# Patient Record
Sex: MALE | Race: WHITE
[De-identification: names, ages, dates, MRNs, and addresses within clinical notes are randomized per-mention and may not be internally consistent; named-entity substitution may affect disease eponyms.]

---

## 2018-04-26 ENCOUNTER — HOSPITAL ENCOUNTER (OUTPATIENT)
Dept: HOSPITAL 17 - NEPE | Age: 53
Setting detail: OBSERVATION
LOS: 2 days | Discharge: HOME | End: 2018-04-28
Attending: HOSPITALIST | Admitting: HOSPITALIST
Payer: SELF-PAY

## 2018-04-26 VITALS
DIASTOLIC BLOOD PRESSURE: 80 MMHG | RESPIRATION RATE: 18 BRPM | SYSTOLIC BLOOD PRESSURE: 155 MMHG | HEART RATE: 96 BPM | OXYGEN SATURATION: 99 %

## 2018-04-26 VITALS
SYSTOLIC BLOOD PRESSURE: 152 MMHG | HEART RATE: 104 BPM | RESPIRATION RATE: 16 BRPM | DIASTOLIC BLOOD PRESSURE: 76 MMHG | OXYGEN SATURATION: 100 %

## 2018-04-26 VITALS
OXYGEN SATURATION: 99 % | RESPIRATION RATE: 19 BRPM | DIASTOLIC BLOOD PRESSURE: 91 MMHG | HEART RATE: 98 BPM | SYSTOLIC BLOOD PRESSURE: 179 MMHG | TEMPERATURE: 98.8 F

## 2018-04-26 VITALS
TEMPERATURE: 98.6 F | DIASTOLIC BLOOD PRESSURE: 77 MMHG | HEART RATE: 95 BPM | SYSTOLIC BLOOD PRESSURE: 122 MMHG | OXYGEN SATURATION: 98 % | RESPIRATION RATE: 18 BRPM

## 2018-04-26 VITALS — WEIGHT: 198.42 LBS | HEIGHT: 73 IN | BODY MASS INDEX: 26.3 KG/M2

## 2018-04-26 VITALS
OXYGEN SATURATION: 98 % | DIASTOLIC BLOOD PRESSURE: 82 MMHG | RESPIRATION RATE: 20 BRPM | HEART RATE: 98 BPM | SYSTOLIC BLOOD PRESSURE: 134 MMHG

## 2018-04-26 DIAGNOSIS — G89.29: ICD-10-CM

## 2018-04-26 DIAGNOSIS — R42: ICD-10-CM

## 2018-04-26 DIAGNOSIS — R00.0: ICD-10-CM

## 2018-04-26 DIAGNOSIS — I10: ICD-10-CM

## 2018-04-26 DIAGNOSIS — R61: ICD-10-CM

## 2018-04-26 DIAGNOSIS — M54.9: ICD-10-CM

## 2018-04-26 DIAGNOSIS — E87.1: ICD-10-CM

## 2018-04-26 DIAGNOSIS — D72.829: ICD-10-CM

## 2018-04-26 DIAGNOSIS — R55: Primary | ICD-10-CM

## 2018-04-26 DIAGNOSIS — Z79.899: ICD-10-CM

## 2018-04-26 LAB
BASOPHILS # BLD AUTO: 0 TH/MM3 (ref 0–0.2)
BASOPHILS NFR BLD: 0.2 % (ref 0–2)
BUN SERPL-MCNC: 15 MG/DL (ref 7–18)
CALCIUM SERPL-MCNC: 9.6 MG/DL (ref 8.5–10.1)
CHLORIDE SERPL-SCNC: 99 MEQ/L (ref 98–107)
CREAT SERPL-MCNC: 1.09 MG/DL (ref 0.6–1.3)
EOSINOPHIL # BLD: 0 TH/MM3 (ref 0–0.4)
EOSINOPHIL NFR BLD: 0.1 % (ref 0–4)
ERYTHROCYTE [DISTWIDTH] IN BLOOD BY AUTOMATED COUNT: 12.6 % (ref 11.6–17.2)
GFR SERPLBLD BASED ON 1.73 SQ M-ARVRAT: 71 ML/MIN (ref 89–?)
GLUCOSE SERPL-MCNC: 133 MG/DL (ref 74–106)
HCO3 BLD-SCNC: 23 MEQ/L (ref 21–32)
HCT VFR BLD CALC: 42.2 % (ref 39–51)
HGB BLD-MCNC: 14.7 GM/DL (ref 13–17)
LYMPHOCYTES # BLD AUTO: 1.5 TH/MM3 (ref 1–4.8)
LYMPHOCYTES NFR BLD AUTO: 10.6 % (ref 9–44)
MCH RBC QN AUTO: 31.7 PG (ref 27–34)
MCHC RBC AUTO-ENTMCNC: 34.9 % (ref 32–36)
MCV RBC AUTO: 91 FL (ref 80–100)
MONOCYTE #: 0.9 TH/MM3 (ref 0–0.9)
MONOCYTES NFR BLD: 6 % (ref 0–8)
NEUTROPHILS # BLD AUTO: 11.9 TH/MM3 (ref 1.8–7.7)
NEUTROPHILS NFR BLD AUTO: 83.1 % (ref 16–70)
PLATELET # BLD: 290 TH/MM3 (ref 150–450)
PMV BLD AUTO: 8.7 FL (ref 7–11)
RBC # BLD AUTO: 4.63 MIL/MM3 (ref 4.5–5.9)
SODIUM SERPL-SCNC: 135 MEQ/L (ref 136–145)
TROPONIN I SERPL-MCNC: (no result) NG/ML (ref 0.02–0.05)
WBC # BLD AUTO: 14.3 TH/MM3 (ref 4–11)

## 2018-04-26 PROCEDURE — 84484 ASSAY OF TROPONIN QUANT: CPT

## 2018-04-26 PROCEDURE — 82550 ASSAY OF CK (CPK): CPT

## 2018-04-26 PROCEDURE — 80307 DRUG TEST PRSMV CHEM ANLYZR: CPT

## 2018-04-26 PROCEDURE — 93880 EXTRACRANIAL BILAT STUDY: CPT

## 2018-04-26 PROCEDURE — 85025 COMPLETE CBC W/AUTO DIFF WBC: CPT

## 2018-04-26 PROCEDURE — 93225 XTRNL ECG REC<48 HRS REC: CPT

## 2018-04-26 PROCEDURE — 96360 HYDRATION IV INFUSION INIT: CPT

## 2018-04-26 PROCEDURE — 93005 ELECTROCARDIOGRAM TRACING: CPT

## 2018-04-26 PROCEDURE — 93226 XTRNL ECG REC<48 HR SCAN A/R: CPT

## 2018-04-26 PROCEDURE — G0378 HOSPITAL OBSERVATION PER HR: HCPCS

## 2018-04-26 PROCEDURE — 93306 TTE W/DOPPLER COMPLETE: CPT

## 2018-04-26 PROCEDURE — 99285 EMERGENCY DEPT VISIT HI MDM: CPT

## 2018-04-26 PROCEDURE — 97162 PT EVAL MOD COMPLEX 30 MIN: CPT

## 2018-04-26 PROCEDURE — 80048 BASIC METABOLIC PNL TOTAL CA: CPT

## 2018-04-26 RX ADMIN — Medication SCH ML: at 22:23

## 2018-04-26 RX ADMIN — HYDROCODONE BITARTRATE AND ACETAMINOPHEN PRN TAB: 7.5; 325 TABLET ORAL at 22:22

## 2018-04-26 NOTE — PD
HPI


Chief Complaint:  Dizziness


Time Seen by Provider:  17:19


Travel History


International Travel<30 days:  No


Contact w/Intl Traveler<30days:  No


Traveled to known affect area:  No





History of Present Illness


HPI


This is a 52-year-old male with a history of chronic back pain, presents today 

after having a syncopal episode while at a restaurant.  Patient apparently was 

eating with his friend when he suddenly became lightheaded and pale.  Friend 

states that he passed out for roughly 2 minutes total.  When he awoke, there 

was no postictal state.  He did not recall actually passing out but did really 

remember feeling funny.  Friend thought that he was either vasovagal or was 

mildly dehydrated.  He states that they brought him home and he had a second 

episode where he nearly passed out.  His friend who is a paramedic states he 

checked his pulse and blood pressure.  His systolic blood pressure was 80.  He 

states that he felt an irregular pulse and was concerned that this may be 

atrial fibrillation.  He reports that he started to regain his function and 

they drove here for further evaluation.  The patient denies any pain.  He 

denies any history of abnormal heart rhythm or previous syncopal episodes.  

There are no other complaints at time of examination.





PFSH


Past Medical History


Asthma:  No


Autoimmune Disease:  No


Cardiovascular Problems:  Yes


COPD:  No


Genitourinary:  No


Hypertension:  Yes


Musculoskeletal:  Yes


Neurologic:  Yes (herniated discs)


Respiratory:  Yes





Past Surgical History


Oral Surgery:  Yes


Other Surgery:  Yes





Social History


Alcohol Use:  Yes (WINE 4 GLASSES A WEEK 9/6/05)


Tobacco Use:  No


Substance Use:  No





Allergies-Medications


(Allergen,Severity, Reaction):  


Coded Allergies:  


     No Known Allergies (Verified  Allergy, Severe, 9/13/05)


Reported Meds & Prescriptions





Reported Meds & Active Scripts


Active


Reported


Tylenol-Codeine #3 (Acetaminophen-Codeine) 300-30 mg Tab 1 Tab PO Q4H PRN








Review of Systems


Except as stated in HPI:  all other systems reviewed are Neg


General / Constitutional:  No: Fever, Chills


HENT:  Positive: Lightheadedness, No: Headaches, Neck Pain


Cardiovascular:  Positive: Syncope, No: Chest Pain or Discomfort, Palpitations, 

Irregular Rhythm


Respiratory:  No: Cough, Shortness of Breath


Gastrointestinal:  No: Nausea, Vomiting, Abdominal Pain


Genitourinary:  No: Dysuria


Musculoskeletal:  No: Weakness, Pain


Neurologic:  Positive: Syncope, No: Dizziness, Headache, Change in Mentation





Physical Exam


Narrative


GENERAL: Well developed well-nourished male in no acute respiratory distress.


SKIN: Focused skin assessment warm/dry.


HEAD: Atraumatic. Normocephalic. 


EYES: Pupils equal and round. No scleral icterus. No injection or drainage. 


ENT: No nasal bleeding or discharge.  Mucous membranes pink and moist.


NECK: Trachea midline. No JVD. 


CARDIOVASCULAR: Sinus tachycardia rate in the low 100s.


RESPIRATORY: No accessory muscle use. Clear to auscultation. Breath sounds 

equal bilaterally. 


GASTROINTESTINAL: Abdomen soft, non-tender, nondistended. Hepatic and splenic 

margins not palpable. 


MUSCULOSKELETAL: No obvious deformities. No clubbing.  No cyanosis.  No edema. 


NEUROLOGICAL: Awake and alert. No obvious cranial nerve deficits.  Motor 

grossly within normal limits. Normal speech.





Data


Data


Last Documented VS





Vital Signs








  Date Time  Temp Pulse Resp B/P (MAP) Pulse Ox O2 Delivery O2 Flow Rate FiO2


 


4/26/18 19:19  106 16  98 Room Air  


 


4/26/18 16:45    155/80 (105)    


 


4/26/18 16:26 98.8       








Orders





 Orders


Complete Blood Count With Diff (4/26/18 17:22)


Basic Metabolic Panel (Bmp) (4/26/18 17:22)


Sodium Chlor 0.9% 1000 Ml Inj (Ns 1000 M (4/26/18 17:30)


Sodium Chlor 0.9% 1000 Ml Inj (Ns 1000 M (4/26/18 17:30)


Electrocardiogram (4/26/18 16:33)


Drug Screen, Random Urine (4/26/18 17:53)


Ckmb (Isoenzyme) Profile (4/26/18 17:25)


Troponin I (4/26/18 17:25)


Oxycodone-Acetamin  Mg (Percocet 1 (4/26/18 19:30)


Diclofenac  (Voltaren Dr) (4/26/18 19:30)


Admit Order (Ed Use Only) (4/26/18 19:29)





Labs





Laboratory Tests








Test


  4/26/18


17:25 4/26/18


17:55


 


White Blood Count 14.3 TH/MM3  


 


Red Blood Count 4.63 MIL/MM3  


 


Hemoglobin 14.7 GM/DL  


 


Hematocrit 42.2 %  


 


Mean Corpuscular Volume 91.0 FL  


 


Mean Corpuscular Hemoglobin 31.7 PG  


 


Mean Corpuscular Hemoglobin


Concent 34.9 % 


  


 


 


Red Cell Distribution Width 12.6 %  


 


Platelet Count 290 TH/MM3  


 


Mean Platelet Volume 8.7 FL  


 


Neutrophils (%) (Auto) 83.1 %  


 


Lymphocytes (%) (Auto) 10.6 %  


 


Monocytes (%) (Auto) 6.0 %  


 


Eosinophils (%) (Auto) 0.1 %  


 


Basophils (%) (Auto) 0.2 %  


 


Neutrophils # (Auto) 11.9 TH/MM3  


 


Lymphocytes # (Auto) 1.5 TH/MM3  


 


Monocytes # (Auto) 0.9 TH/MM3  


 


Eosinophils # (Auto) 0.0 TH/MM3  


 


Basophils # (Auto) 0.0 TH/MM3  


 


CBC Comment DIFF FINAL  


 


Differential Comment   


 


Blood Urea Nitrogen 15 MG/DL  


 


Creatinine 1.09 MG/DL  


 


Random Glucose 133 MG/DL  


 


Calcium Level 9.6 MG/DL  


 


Sodium Level 135 MEQ/L  


 


Potassium Level 3.9 MEQ/L  


 


Chloride Level 99 MEQ/L  


 


Carbon Dioxide Level 23.0 MEQ/L  


 


Anion Gap 13 MEQ/L  


 


Estimat Glomerular Filtration


Rate 71 ML/MIN 


  


 


 


Total Creatine Kinase 77 U/L  


 


Troponin I


  LESS THAN 0.02


NG/ML 


 


 


Urine Opiates Screen  POS 


 


Urine Barbiturates Screen  NEG 


 


Urine Amphetamines Screen  NEG 


 


Urine Benzodiazepines Screen  NEG 


 


Urine Cocaine Screen  NEG 


 


Urine Cannabinoids Screen  POS 











MDM


Medical Decision Making


Medical Screen Exam Complete:  Yes


Emergency Medical Condition:  Yes


Differential Diagnosis


Vasovagal syncope versus cardiac dysrhythmia versus metabolic derangement


Narrative Course


52-year-old male presents today after having syncopal episode and a following 

near syncopal episode.  Patient was tachycardic when he presented.  He was 

given 2 L of IV fluid and still remains tachycardic with heart rate of 100.  

Given this, patient will be admitted.  I discussed case with the on-call 

hospitalist who is agreeable for an observation admission.  The patient has no 

prior history of syncope.  He does have acute on chronic back pain.  He was 

given Voltaren and Percocet for his chronic back pain.





Diagnosis





 Primary Impression:  


 Syncope


 Additional Impressions:  


 HTN (hypertension)


 Chronic back pain





Admitting Information


Admitting Physician Requests:  Observation











Ed Roberts MD Apr 26, 2018 18:07

## 2018-04-26 NOTE — HHI.HP
__________________________________________________





hospitals


Service


[facility]


Primary Care Physician


Vlad Dunbar MD


Admission Diagnosis





syncope, persistant tachycardia, chronic back pain


Diagnoses:  


Travel History


International Travel<30 Days:  No


Contact w/Intl Traveler <30 Da:  No


Traveled to Known Affected Are:  No


History of Present Illness


52-year-old male with a past medical history significant for hypertension and 

degenerative disc disease presents to the emergency department for evaluation 

of a syncopal episode.  Patient reports that he was at 120 felt dizzy, faint 

and diaphoretic.  He slumped over and lost consciousness for approximately 2 

minutes which was witnessed by a friend.  No seizure-like activity.  No loss of 

bowel/bladder.  The patient decided to go home to rest and he was lying on the 

couch.  When he went to sit up from lying down he became diaphoretic and dizzy 

again.  This time he did not lose consciousness.  His friend took his blood 

pressure and found it to be 84/52.  The patient takes antihypertensive 

medication and an unspecified narcotic for his back pain.  He denies any chest 

pain or shortness of breath.  No nausea/vomiting/diarrhea/abdominal pain.  No 

lateralizing signs/symptoms.  No fever/chills.





Review of Systems


Except as stated in HPI:  all other systems reviewed are Neg





Past Family Social History


Past Medical History


Hypertension


Degenerative disc disease


Past Surgical History


Back surgery


Tonsillectomy


Reported Medications





Reported Meds & Active Scripts


Active


Reported


Tylenol-Codeine #3 (Acetaminophen-Codeine) 300-30 mg Tab 1 Tab PO Q4H PRN


Lisinopril-Hctz 10-12.5 Mg Tab Unknown Dose PO DAILY


Allergies:  


Coded Allergies:  


     No Known Allergies (Verified  Allergy, Severe, 05)


Family History


Negative for CAD/DM


Social History


Negative for tobacco.  Occasional alcohol.  Denies all illicit drugs





Physical Exam


Vital Signs





Vital Signs








  Date Time  Temp Pulse Resp B/P (MAP) Pulse Ox O2 Delivery O2 Flow Rate FiO2


 


18 20:40  104 16 152/76 (101) 100 Room Air  


 


18 19:19  106 16  98 Room Air  


 


4/26/18 16:45  96 18 155/80 (105) 99 Room Air  


 


18 16:26 98.8 98 19 179/91 (120) 99 Room Air  


 


18 16:15  98 20 134/82 (99) 98   








Physical Exam


GENERAL:  male sitting up in bed


SKIN: No rashes, ecchymoses or lesions. Cool and dry.


HEAD: Atraumatic. Normocephalic. No temporal or scalp tenderness.


EYES: Pupils equal round and reactive. Extraocular motions intact. No scleral 

icterus. No injection or drainage. 


ENT: Nose without bleeding, purulent drainage or septal hematoma. Throat 

without erythema, tonsillar hypertrophy or exudate. Uvula midline. Airway 

patent.


NECK: Trachea midline. No JVD or lymphadenopathy. Supple, nontender, no 

meningeal signs.


CARDIOVASCULAR: Regular rate and rhythm without murmurs, gallops, or rubs. 


RESPIRATORY: Clear to auscultation. Breath sounds equal bilaterally. No wheezes

, rales, or rhonchi.  


GASTROINTESTINAL: Abdomen soft, non-tender, nondistended. No hepato-splenomegaly

, or palpable masses. No guarding.


MUSCULOSKELETAL: Extremities without clubbing, cyanosis, or edema. No joint 

tenderness, effusion, or edema noted. No calf tenderness. 


NEUROLOGICAL: Awake and alert. Cranial nerves II through XII intact.  Motor and 

sensory grossly within normal limits. Normal speech.


Laboratory





Laboratory Tests








Test


  18


17:25 18


17:55


 


White Blood Count 14.3  


 


Red Blood Count 4.63  


 


Hemoglobin 14.7  


 


Hematocrit 42.2  


 


Mean Corpuscular Volume 91.0  


 


Mean Corpuscular Hemoglobin 31.7  


 


Mean Corpuscular Hemoglobin


Concent 34.9 


  


 


 


Red Cell Distribution Width 12.6  


 


Platelet Count 290  


 


Mean Platelet Volume 8.7  


 


Neutrophils (%) (Auto) 83.1  


 


Lymphocytes (%) (Auto) 10.6  


 


Monocytes (%) (Auto) 6.0  


 


Eosinophils (%) (Auto) 0.1  


 


Basophils (%) (Auto) 0.2  


 


Neutrophils # (Auto) 11.9  


 


Lymphocytes # (Auto) 1.5  


 


Monocytes # (Auto) 0.9  


 


Eosinophils # (Auto) 0.0  


 


Basophils # (Auto) 0.0  


 


CBC Comment DIFF FINAL  


 


Differential Comment   


 


Blood Urea Nitrogen 15  


 


Creatinine 1.09  


 


Random Glucose 133  


 


Calcium Level 9.6  


 


Sodium Level 135  


 


Potassium Level 3.9  


 


Chloride Level 99  


 


Carbon Dioxide Level 23.0  


 


Anion Gap 13  


 


Estimat Glomerular Filtration


Rate 71 


  


 


 


Total Creatine Kinase 77  


 


Troponin I LESS THAN 0.02  


 


Urine Opiates Screen  POS 


 


Urine Barbiturates Screen  NEG 


 


Urine Amphetamines Screen  NEG 


 


Urine Benzodiazepines Screen  NEG 


 


Urine Cocaine Screen  NEG 


 


Urine Cannabinoids Screen  POS 








Result Diagram:  


18








Caprinlogan VTE Risk Assessment


Caprini VTE Risk Assessment:  No/Low Risk (score <= 1)


Caprini Risk Assessment Model











 Point Value = 1          Point Value = 2  Point Value = 3  Point Value = 5


 


Age 41-60


Minor surgery


BMI > 25 kg/m2


Swollen legs


Varicose veins


Pregnancy or postpartum


History of unexplained or recurrent


   spontaneous 


Oral contraceptives or hormone


   replacement


Sepsis (< 1 month)


Serious lung disease, including


   pneumonia (< 1 month)


Abnormal pulmonary function


Acute myocardial infarction


Congestive heart failure (< 1 month)


History of inflammatory bowel disease


Medical patient at bed rest Age 61-74


Arthroscopic surgery


Major open surgery (> 45 min)


Laparoscopic surgery (> 45 min)


Malignancy


Confined to bed (> 72 hours)


Immobilizing plaster cast


Central venous access Age >= 75


History of VTE


Family history of VTE


Factor V Leiden


Prothrombin [id number]


Lupus anticoagulant


Anticardiolipin antibodies


Elevated serum homocysteine


Heparin-induced thrombocytopenia


Other congenital or acquired


   thrombophilia Stroke (< 1 month)


Elective arthroplasty


Hip, pelvis, or leg fracture


Acute spinal cord injury (< 1 month)








Prophylaxis Regimen











   Total Risk


Factor Score Risk Level Prophylaxis Regimen


 


0-1      Low Early ambulation


 


2 Moderate Order ONE of the following:


*Sequential Compression Device (SCD)


*Heparin 5000 units SQ BID


 


3-4 Higher Order ONE of the following medications:


*Heparin 5000 units SQ TID


*Enoxaparin/Lovenox 40 mg SQ daily (WT < 150 kg, CrCl > 30 mL/min)


*Enoxaparin/Lovenox 30 mg SQ daily (WT < 150 kg, CrCl > 10-29 mL/min)


*Enoxaparin/Lovenox 30 mg SQ BID (WT < 150 kg, CrCl > 30 mL/min)


AND/OR


*Sequential Compression Device (SCD)


 


5 or more Highest Order ONE of the following medications:


*Heparin 5000 units SQ TID (Preferred with Epidurals)


*Enoxaparin/Lovenox 40 mg SQ daily (WT < 150 kg, CrCl > 30 mL/min)


*Enoxaparin/Lovenox 30 mg SQ daily (WT < 150 kg, CrCl > 10-29 mL/min)


*Enoxaparin/Lovenox 30 mg SQ BID (WT < 150 kg, CrCl > 30 mL/min)


AND


*Sequential Compression Device (SCD)











Assessment and Plan


Assessment and Plan


Assessment/plan:





1.  Syncope


Unclear etiology


May be vasovagal or secondary to orthostatic hypotension


Orthostatic vital signs pending


Carotid ultrasound/echo pending





2.  Tachycardia


Unclear etiology


Patient in normal sinus rhythm


Telemetry





3.  Hypertension


Continue lisinopril-HCTZ as patient currently hypertensive





FEN


Heart healthy diet


Electrolytes: Monitor and replete as needed











Bing Miller MD 2018 21:15

## 2018-04-26 NOTE — RADRPT
EXAM DATE/TIME:  04/26/2018 20:47 

 

[facility] COMPARISON:     

No previous studies available for comparison.

        

 

 

INDICATIONS :     

Syncope

                     

 

MEDICAL HISTORY :           

Herniated discs.

 

SURGICAL HISTORY :     

None.     

 

ENCOUNTER:     

Initial

 

ACUITY:     

1 day

 

PAIN SCORE:     

0/10

 

LOCATION:     

Bilateral neck 

                     

PEAK SYSTOLIC VELOCITIES (cm/sec):

 

ICA/CCA RATIO:                    

Right: 1.36     Left: 1.39

 

ICA:                          

Right: 163     Left: 175

 

CCA:                          

Right: 120     Left: 126

 

ECA:                           

Right: 129     Left: 168

 

VERTEBRAL:           

Right: 59 antegrade     Left: 60 antegrade

             

Elevated flow velocities and ICA/CCA ratios have been found to correlate with increased degrees of

vessel stenosis, calculated as percentage of diameter relative to a normal segment of distal ICA/CCA

 

FINDINGS:     

 

RIGHT CAROTID:     

No significant stenosis is visualized.  The waveforms are within normal limits.

 

LEFT CAROTID:     

No significant stenosis is visualized.  The waveforms are within normal limits.

 

VERTEBRAL ARTERIES:  

Antegrade flow is seen in both vertebral arteries.

 

MISCELLANEOUS:     

None.

 

CONCLUSION:     No significant plaque or narrowing.

 

 

 

 Eduardo Anderson MD on April 26, 2018 at 22:15           

Board Certified Radiologist.

 This report was verified electronically.

## 2018-04-27 VITALS
TEMPERATURE: 97.8 F | SYSTOLIC BLOOD PRESSURE: 123 MMHG | DIASTOLIC BLOOD PRESSURE: 77 MMHG | OXYGEN SATURATION: 97 % | HEART RATE: 76 BPM | RESPIRATION RATE: 18 BRPM

## 2018-04-27 VITALS
RESPIRATION RATE: 18 BRPM | OXYGEN SATURATION: 98 % | DIASTOLIC BLOOD PRESSURE: 78 MMHG | SYSTOLIC BLOOD PRESSURE: 118 MMHG | TEMPERATURE: 98.7 F | HEART RATE: 72 BPM

## 2018-04-27 VITALS
DIASTOLIC BLOOD PRESSURE: 72 MMHG | HEART RATE: 86 BPM | TEMPERATURE: 98.2 F | RESPIRATION RATE: 17 BRPM | OXYGEN SATURATION: 97 % | SYSTOLIC BLOOD PRESSURE: 124 MMHG

## 2018-04-27 VITALS
TEMPERATURE: 98.7 F | RESPIRATION RATE: 18 BRPM | HEART RATE: 79 BPM | DIASTOLIC BLOOD PRESSURE: 75 MMHG | SYSTOLIC BLOOD PRESSURE: 125 MMHG | OXYGEN SATURATION: 99 %

## 2018-04-27 VITALS
HEART RATE: 74 BPM | RESPIRATION RATE: 16 BRPM | DIASTOLIC BLOOD PRESSURE: 71 MMHG | SYSTOLIC BLOOD PRESSURE: 115 MMHG | TEMPERATURE: 98.2 F | OXYGEN SATURATION: 98 %

## 2018-04-27 VITALS
TEMPERATURE: 98.5 F | SYSTOLIC BLOOD PRESSURE: 118 MMHG | DIASTOLIC BLOOD PRESSURE: 70 MMHG | RESPIRATION RATE: 16 BRPM | HEART RATE: 86 BPM | OXYGEN SATURATION: 97 %

## 2018-04-27 VITALS
SYSTOLIC BLOOD PRESSURE: 119 MMHG | DIASTOLIC BLOOD PRESSURE: 56 MMHG | RESPIRATION RATE: 16 BRPM | OXYGEN SATURATION: 98 % | TEMPERATURE: 98.4 F | HEART RATE: 77 BPM

## 2018-04-27 VITALS — HEART RATE: 75 BPM

## 2018-04-27 VITALS — HEART RATE: 87 BPM

## 2018-04-27 LAB
BASOPHILS # BLD AUTO: 0 TH/MM3 (ref 0–0.2)
BASOPHILS NFR BLD: 0.5 % (ref 0–2)
BUN SERPL-MCNC: 12 MG/DL (ref 7–18)
CALCIUM SERPL-MCNC: 9.3 MG/DL (ref 8.5–10.1)
CHLORIDE SERPL-SCNC: 105 MEQ/L (ref 98–107)
CREAT SERPL-MCNC: 0.8 MG/DL (ref 0.6–1.3)
EOSINOPHIL # BLD: 0 TH/MM3 (ref 0–0.4)
EOSINOPHIL NFR BLD: 0.6 % (ref 0–4)
ERYTHROCYTE [DISTWIDTH] IN BLOOD BY AUTOMATED COUNT: 12.6 % (ref 11.6–17.2)
GFR SERPLBLD BASED ON 1.73 SQ M-ARVRAT: 101 ML/MIN (ref 89–?)
GLUCOSE SERPL-MCNC: 91 MG/DL (ref 74–106)
HCO3 BLD-SCNC: 26.5 MEQ/L (ref 21–32)
HCT VFR BLD CALC: 38.2 % (ref 39–51)
HGB BLD-MCNC: 13.6 GM/DL (ref 13–17)
LYMPHOCYTES # BLD AUTO: 2.8 TH/MM3 (ref 1–4.8)
LYMPHOCYTES NFR BLD AUTO: 42.7 % (ref 9–44)
MCH RBC QN AUTO: 32 PG (ref 27–34)
MCHC RBC AUTO-ENTMCNC: 35.6 % (ref 32–36)
MCV RBC AUTO: 89.9 FL (ref 80–100)
MONOCYTE #: 0.6 TH/MM3 (ref 0–0.9)
MONOCYTES NFR BLD: 8.6 % (ref 0–8)
NEUTROPHILS # BLD AUTO: 3.1 TH/MM3 (ref 1.8–7.7)
NEUTROPHILS NFR BLD AUTO: 47.6 % (ref 16–70)
PLATELET # BLD: 254 TH/MM3 (ref 150–450)
PMV BLD AUTO: 8.3 FL (ref 7–11)
RBC # BLD AUTO: 4.25 MIL/MM3 (ref 4.5–5.9)
SODIUM SERPL-SCNC: 139 MEQ/L (ref 136–145)
WBC # BLD AUTO: 6.5 TH/MM3 (ref 4–11)

## 2018-04-27 RX ADMIN — HYDROCODONE BITARTRATE AND ACETAMINOPHEN PRN TAB: 7.5; 325 TABLET ORAL at 22:43

## 2018-04-27 RX ADMIN — HYDROCODONE BITARTRATE AND ACETAMINOPHEN PRN TAB: 7.5; 325 TABLET ORAL at 10:38

## 2018-04-27 RX ADMIN — HYDROCODONE BITARTRATE AND ACETAMINOPHEN PRN TAB: 7.5; 325 TABLET ORAL at 02:20

## 2018-04-27 RX ADMIN — HYDROCODONE BITARTRATE AND ACETAMINOPHEN PRN TAB: 7.5; 325 TABLET ORAL at 14:26

## 2018-04-27 RX ADMIN — HYDROCODONE BITARTRATE AND ACETAMINOPHEN PRN TAB: 7.5; 325 TABLET ORAL at 18:20

## 2018-04-27 RX ADMIN — Medication SCH ML: at 22:43

## 2018-04-27 RX ADMIN — HYDROCODONE BITARTRATE AND ACETAMINOPHEN PRN TAB: 7.5; 325 TABLET ORAL at 06:25

## 2018-04-27 RX ADMIN — Medication SCH ML: at 09:00

## 2018-04-27 NOTE — EKG
Date Performed: 04/26/2018       Time Performed: 16:33:18

 

PTAGE:      52 years

 

EKG:      SINUS TACHYCARDIA ABNORMAL RHYTHM ECG 

 

NO PREVIOUS TRACING            

 

DOCTOR:   Per Alvarado  Interpretating Date/Time  04/27/2018 13:36:32

## 2018-04-27 NOTE — HHI.PR
Subjective


Remarks


Follow-up on patient with syncopal episode.  Patient seen and examined.  

Patient denies any acute medical complaints at this time.  States he did get up 

once earlier this morning to use the urinal at the bedside and did not have any 

episodes of dizziness or lightheadedness.  He denies any fever or chills.  He 

denies any chest pain or dyspnea.  He denies any nausea, vomiting or abdominal 

pain.  He denies any urinary to bowel difficulties.  Patient reports being at 

[facility] yesterday sitting down eating a salad when senior care through 

the meal patient became diaphoretic, dizzy lost his vision and passed out for 

several minutes.  Patient denies any associated palpitations, chest pain, 

shortness of breath, nausea or vomiting.  Shortly after returning home, patient 

had a similar episode when getting up from a supine position.  He reports his 

friend who is a paramedic checked his pulse and told him that he had an 

irregular rhythm.  He denies any seizure activity, loss of bowel or bladder 

incontinence, tongue biting, postictal confusion.





Objective


Vitals





Vital Signs








  Date Time  Temp Pulse Resp B/P (MAP) Pulse Ox O2 Delivery O2 Flow Rate FiO2


 


4/27/18 09:41   20     


 


4/27/18 07:41 98.7 72 18 118/78 (91) 98   


 


4/27/18 03:47 98.4 77 16 119/56 (77) 98   


 


4/27/18 03:18  75      


 


4/27/18 00:33 98.5 86 16 118/70 (86) 97   


 


4/27/18 00:01  87      


 


4/26/18 22:03 98.6 95 18 122/69 (86) 98   





    130/77 (94)    





    115/78 (90)    


 


4/26/18 21:38        


 


4/26/18 20:40  104 16 152/76 (101) 100 Room Air  


 


4/26/18 19:19  106 16  98 Room Air  


 


4/26/18 16:45  96 18 155/80 (105) 99 Room Air  


 


4/26/18 16:26 98.8 98 19 179/91 (120) 99 Room Air  


 


4/26/18 16:15  98 20 134/82 (99) 98   








Result Diagram:  


4/27/18 0622                                                                   

             4/27/18 0622





Imaging





Last Impressions








Carotid Artery Ultrasound 4/26/18 0000 Signed





Impressions: 





 Service Date/Time:  Thursday, April 26, 2018 20:47 - CONCLUSION: No 

significant 





 plaque or narrowing.     Eduardo Anderson MD 








Objective Remarks


GENERAL: Well-developed well-nourished  male patient, in no acute 

distress.  Awake and alert.  Lying in hospital bed.  


SKIN: No rashes, ecchymoses or lesions. Cool and dry.


HEAD: Atraumatic. Normocephalic. No temporal or scalp tenderness.


EYES: Pupils equal round and reactive. Extraocular motions intact. No scleral 

icterus. No injection or drainage. 


ENT: Nose without bleeding or purulent drainage. Airway patent.  MMM.


NECK: Trachea midline. No JVD or lymphadenopathy. Supple, nontender, no 

meningeal signs.


CARDIOVASCULAR: Regular rate and rhythm without murmurs, gallops, or rubs. 


RESPIRATORY: Clear to auscultation. Breath sounds equal bilaterally. No wheezes

, rales, or rhonchi.  


GASTROINTESTINAL: Abdomen soft, non-tender, nondistended. No hepato-splenomegaly

, or palpable masses. No guarding.


MUSCULOSKELETAL: Extremities without clubbing, cyanosis, or edema. No calf 

tenderness. 


NEUROLOGICAL: Awake and alert. Cranial nerves II through XII grossly intact.  

Motor and sensory grossly within normal limits. Normal speech.


PSYCHIATRIC:  Appropriate mood and affect.  Normal judgement and insight.


Procedures


None


Medications and IVs





Current Medications








 Medications


  (Trade)  Dose


 Ordered  Sig/Vilma


 Route  Start Time


 Stop Time Status Last Admin


 


  (NS Flush)  2 ml  UNSCH  PRN


 IV FLUSH  4/26/18 20:00


     


 


 


  (NS Flush)  2 ml  BID


 IV FLUSH  4/26/18 21:00


    4/26/18 22:23


 


 


  (Tylenol)  650 mg  Q4H  PRN


 PO  4/26/18 20:00


     


 


 


  (Zofran Inj)  4 mg  Q6H  PRN


 IVP  4/26/18 20:00


     


 


 


  (Narcan Inj)  0.4 mg  UNSCH  PRN


 IV PUSH  4/26/18 20:00


     


 


 


  (Prinivil)  10 mg  DAILY


 PO  4/27/18 09:00


   Future Hold  


 


 


  (Microzide)  12.5 mg  DAILY


 PO  4/27/18 09:00


   Future Hold  


 


 


  (Norco  7.5-325


 Mg)  1 tab  Q4H  PRN


 PO  4/26/18 21:15


    4/27/18 06:25


 











A/P


Assessment and Plan


52-year-old male with a past medical history significant for hypertension and 

degenerative disc disease presents to the emergency department for evaluation 

of a syncopal episode.  





Syncope


Unclear etiology, may be vasovagal or secondary to orthostatic hypotension or 

opiate/THC related


UDS positive for opiates and cannabis


Patient reports episode of diaphoresis, dizziness, tunnel vision, reported 

abnormal rhythm followed by syncopal episode of 2mins duration


Orthostatic vital signs negative


Carotid ultrasound negative for hemodynamically significant stenosis


   -Given concern for possible arrhythmogenic etiology of patient's syncopal 

episode will consult cardiology.  Appreciate recommendations.


   -Repeat orthostatic vital signs


   -Follow-up on echocardiogram results


   -Holter monitor


   -PT eval/tx





Tachycardia


Unclear etiology, possibly situational


Patient in normal sinus rhythm


   -continue to monitor on telemetry





Leukocytosis, resolved


Suspect reactive


Patient is afebrile


   -Continue to monitor white count as indicated





Hyponatremic


Resolved status post IV fluids





Hypertension, currently normotensive


   -Hold patient's home lisinopril-HCTZ for now


   -Continue to monitor BP and adjust treatment accordingly





Chronic back pain


   -[place] as needed





FEN


Heart healthy diet


Electrolytes: Monitor and replete as needed


Discharge Planning


Discharge pending completion of workup and cardiology clearance











Hilary Hassan Apr 27, 2018 10:28

## 2018-04-27 NOTE — ECHRPT
Indication:   Syncope

 

 CONCLUSIONS

 The left ventricular systolic function is low normal with an estimated ejection fraction in the rang
e of 50-

 55%. 

 Trace mitral valve regurgitation. 

 There is trace tricuspid valve regurgitation. 

 Trivial pulmonary valve regurgitation. 

 

 

 BP:  118   / 78      HR: 83                       Rhythm:           Sinus

 

 MEASUREMENTS  (Male / Female) Normal Values       Technical Quality:Fair

 2D ECHO

 LV Diastolic Diameter PLAX        4.6 cm                4.2 - 5.9 / 3.9 - 5.3 cm

 LV Systolic Diameter PLAX         3.3 cm                

 IVS Diastolic Thickness           0.9 cm                0.6 - 1.0 / 0.6 - 0.9 cm

 LVPW Diastolic Thickness          0.9 cm                0.6 - 1.0 / 0.6 - 0.9 cm

 LV Relative Wall Thickness        0.4                   

 RV Internal Dim ED PLAX           3.6 cm                

 LVOT Diameter                     2.2 cm                

 LA Systolic Diameter LX           2.6 cm                3.0 - 4.0 / 2.7 - 3.8 cm

 

 M-MODE

 Aortic Root Diameter MM           3.2 cm                

 LA Systolic Diameter MM           2.9 cm                

 LA Ao Ratio MM                    0.9                   

 AV Cusp Separation MM             2.3 cm                

 

 DOPPLER

 AV Peak Velocity                  98.9 cm/s             

 AV Peak Gradient                  3.9 mmHg              

 LVOT Peak Velocity                83.3 cm/s             

 LVOT Peak Gradient                2.8 mmHg              

 AV Area Cont Eq pk                3.2 cm               

 MV Area PHT                       2.8 cm               

 Mitral E Point Velocity           63.2 cm/s             

 Mitral A Point Velocity           40.9 cm/s             

 Mitral E to A Ratio               1.5                   

 LV E' Lateral Velocity            10.1 cm/s             

 Mitral E to LV E' Lateral Ratio   6.3                   

 LV E' Septal Velocity             10.6 cm/s             

 Mitral E to LV E' Septal Ratio    6.0                   

 TR Peak Velocity                  191.0 cm/s            

 TR Peak Gradient                  14.6 mmHg             

 Right Atrial Pressure             10.0 mmHg             

 Pulmonary Artery Systolic Pressu  24.6 mmHg             

 Right Ventricular Systolic Press  24.6 mmHg             

 

 

 FINDINGS

 

 LEFT VENTRICLE

 The left ventricular systolic function is low normal with an estimated ejection fraction in the rang
e of 50-

 55%. 

 Wall thickness is normal. 

 Normal left ventricular size. 

 No obvious wall motion abnormalities

 

 RIGHT VENTRICLE

 Normal right ventricular size and systolic function.  

 

 LEFT ATRIUM

 The left atrial size is normal.  

 

 RIGHT ATRIUM

 The right atrial size is normal.  

 

 ATRIAL SEPTUM

 Normal atrial septal thickness

 

 AORTA

 The aortic root and proximal ascending aorta are normal in size on limited imaging.  

 

 MITRAL VALVE

 Structurally normal mitral valve. 

 Trace mitral valve regurgitation. 

 No mitral valve stenosis. 

 

 AORTIC VALVE

 Trileaflet aortic valve. 

 No aortic valve stenosis. 

 

 

 TRICUSPID VALVE

 Structurally normal tricuspid valve. 

 There is trace tricuspid valve regurgitation. 

 The estimated pulmonary arterial pressure is 24.6 mmHg. 

 

 PULMONARY VALVE

 Trivial pulmonary valve regurgitation. 

 

 

 VESSELS

 The inferior vena cava is normal in size.  

 

 PERICARDIUM

 No pericardial effusion.  

 

 

 

 

  Demarco Hale DO

  (Electronically Signed)

  Final Date:27 April 2018 14:26

## 2018-04-27 NOTE — MB
cc:

Rell Gonzalez MD, Jose R MD

****

 

 

DATE:

2018

 

REASON FOR CONSULTATION:

Syncopal episode.

 

HISTORY OF PRESENT ILLNESS:

Mr. Rebollar is a 52-year-old  gentleman with history of high 

blood pressure, previous back surgery.  He was at a restaurant, felt 

dizzy and fainted.  He completely passed out, based on his friend, for

at least 2 minutes.  They decided not to call 911.  They went home.  

Sitting in the car the same episode happened again and at that point, 

he decided to come to the emergency room.  There was not loss of 

sphincter control and no seizing.  The patient was evaluated by Dr. Hale who believed this is not hypotension nor dehydration and 

patient was consulted for evaluation.  The chart was reviewed.  The 

patient was evaluated.  The patient did not drink any alcohol.

 

ALLERGIES:

None.

 

SOCIAL HISTORY:

The patient has an occasional drink.

 

FAMILY HISTORY:

Noncontributory to his current medical condition.

 

MEDICATIONS:

He is takin.  Tylenol # 3 p.r.n.

2.  Lisinopril/hydrochlorothiazide.

 

REVIEW OF SYSTEMS:

He referred no chest pain, no chest discomfort, no palpitation.  No 

dizziness.  No seizures.  No vomiting.

 

PHYSICAL EXAMINATION:

GENERAL:  Alert, fully oriented.

VITAL SIGNS:  Blood pressure is 122/77, pulse 76, respiratory rate 18.

LUNGS:  Ventilated.

CARDIOVASCULAR:  S1, S2.  No gallop.  No murmur.

ABDOMEN:  Soft.  No mass. No bruits.

EXTREMITIES:  No edema.

 

LABORATORY DATA:

Electrocardiogram shows sinus rhythm, rate in the low 100s.  No acute 

ST and T-wave changes.

 

LABORATORY DATA:

Hemoglobin is 13.6, white blood cell 6.5.

Toxicology positive for cannabis and urine positive for opioid screen,

yet the patient is taking Tylenol #3.

Potassium 3.9, creatinine 0.80.

Troponin less than 0.2.

 

ASSESSMENT AND RECOMMENDATIONS:

Mr. Rebollar had a syncopal episode.  There was no warning.  He referred 

in the past some dizziness and near-syncope.  Blood pressure currently

adequate.  There is no acute ST and T-wave changes.

 

I had a long conversation with him as well as Dr. Hale.  

Electrophysiology study will be obtained.  If no conduction disease is

found or no arrhythmia induced, then the gentleman would need a loop 

recorder.  The risks, the nature and the benefits of the procedure 

were clearly stated to him.  Risks include pneumothorax, cardiac 

perforation, stroke and even death.  He understands and agreed to 

proceed.  I will wait for the 2-D echo report before making further 

decisions about management.

 

 

__________________________________

MD DANIEL Frances/SB

D: 2018, 02:14 PM

T: 2018, 03:16 PM

Visit #: [id number]

Job #: [id number]

## 2018-04-27 NOTE — MB
cc:

Demarco Hale DO

****

 

 

DATE:

04/27/2018

 

REASON FOR CONSULTATION:

Syncope.

 

HISTORY OF PRESENT ILLNESS:

Stephane Rebollar is a pleasant 52-year-old male who presented to [facility] Emergency Room on 04/26/2018 after 2 syncopal episodes.

 He states that he had a normal day and friends were visiting from out

of town.  They were out to lunch and he had eaten part of his meal.  

He started feeling lightheaded, having some tunnel vision and 

diaphoretic, but no chest pain, shortness of breath or palpitations.  

He then passed out for approximately 2 minutes.  During this, no 

seizure activity or loss of bowel or bladder.  When he came to, there 

was no postictal phase.  He just felt overall weak.  EMS had been 

called at the time, but they decided that he was feeling a little bit 

better and so they canceled EMS.  He went home and was resting on the 

couch and felt like he had to use the restroom.  He went from laying 

down to sitting and had a similar type feeling that he was going to 

pass out, but did not pass out this time.  He felt extremely dizzy and

his friend states that he was extremely diaphoretic.  His friend took 

his blood pressure and it was around 80/50.  He also felt his pulse 

and felt that it was somewhat irregular.  Because of that, he was 

brought to the emergency room.  On arrival to the emergency room, 

blood pressure and heart rate were stable.  In seeing him, he 

currently denies chest pain, shortness of breath, or palpitations at 

this time.  Of note, he is on antihypertensives, but has been on 

lisinopril/hydrochlorothiazide for around 10 years without problems.  

He also takes pain medication for back pain and has taken them in the 

normal fashion in the past and has taken them in a similar manner for 

the past 4-5 weeks with no changes.

 

PAST MEDICAL HISTORY:

1.  Hypertension.

2.  Degenerative disc disease.

 

PAST SURGICAL HISTORY:

1.  Back surgery.

2.  Tonsillectomy.

 

ALLERGIES:

NO KNOWN DRUG ALLERGIES.

 

MEDICATIONS:

1.  Lisinopril/hydrochlorothiazide 10/12.5 mg daily.

2.  Tylenol with codeine #3, 300/30 one tab every 4 hours as needed 

for pain.

 

FAMILY HISTORY:

Denies premature coronary artery disease or sudden cardiac death 

within the family.

 

SOCIAL HISTORY:

The patient denies tobacco or illicit drug abuse.  He has been on pain

medication for his back for the past 4-5 weeks.  He occasionally 

drinks alcohol.

 

REVIEW OF SYSTEMS:

Fourteen systems were reviewed including osteopathic pertinent 

positives and negatives as above, otherwise negative.

 

PHYSICAL EXAMINATION:

VITAL SIGNS:  Temperature 98.7, heart rate 72, blood pressure 118/78, 

respirations 18, pulse oximetry 98% on room air.

GENERAL:  The patient appears well, no acute distress, alert, awake 

and oriented x 3.

HEENT:  Extraocular muscles intact.  Mucous membranes moist.

NECK:  Supple.  No JVD at 45 degrees.  No carotid bruits heard 

bilaterally.  Carotid upstroke is brisk in nature.

HEART:  Regular rate and rhythm.  Positive first and second heart 

sounds with no murmurs, gallops or rubs.

LUNGS:  Clear to auscultation bilaterally.  No wheezes, rales or 

rhonchi.

ABDOMEN:  Soft, nontender, nondistended.  No organomegaly noted.

EXTREMITIES:  Show no clubbing, cyanosis or edema.  Femoral and distal

pulses are intact bilaterally.

NEUROLOGIC:  No focal deficits.

SKIN:  Warm, dry and intact.

OSTEOPATHICALLY:  No kyphoscoliosis, lordosis or paraspinal tender 

points.

 

LABORATORY DATA:

Hemoglobin 13.6, hematocrit 38.2, platelet 254.

 

Potassium 3.9, BUN 12, creatinine 0.80.  Troponin less than 0.02.

 

Electrocardiogram (04/26/2018 16:33) sinus tachycardia, otherwise no 

significant ST-T wave changes.

 

IMPRESSIONS:

1.  Syncopal episode x 2.

2.  Hypertension.

 

RECOMMENDATIONS:

1.  Mr. Rebollar appears to have had 2 syncopal episodes which have an 

unclear etiology.

2.  He did present slightly dehydrated, although the syncopal episode 

should not happen while sitting down and lying down due to 

dehydration.

3.  I do not feel that he has any reason to have vasovagal syncope.

4.  He does have concern with his symptoms of feeling like he is going

to black out of neurocardiogenic syncope and I will have him evaluated

by Dr. Gonzalez.  Plan for a possible EP study in my discussion with Dr. Gonzalez.

5.  We will check a 2-D echo to look at his overall left ventricular 

function, cardiac structure and possible valvulopathies.

6.  We will continue to hold his lisinopril/hydrochlorothiazide at 

this time until further evaluation.

 

Thank you for allowing me to see Stephane Rebollar.  If there are any 

questions, please do not hesitate to call.

 

 

 

 

__________________________________

Demarco aHle DO

 

 

VGP/DL

D: 04/27/2018, 11:10 AM

T: 04/27/2018, 11:36 AM

Visit #: [id number]

Job #: [id number]

## 2018-04-28 VITALS
SYSTOLIC BLOOD PRESSURE: 123 MMHG | OXYGEN SATURATION: 95 % | DIASTOLIC BLOOD PRESSURE: 70 MMHG | TEMPERATURE: 97.3 F | RESPIRATION RATE: 20 BRPM | HEART RATE: 78 BPM

## 2018-04-28 VITALS
TEMPERATURE: 98.5 F | SYSTOLIC BLOOD PRESSURE: 118 MMHG | HEART RATE: 89 BPM | RESPIRATION RATE: 16 BRPM | OXYGEN SATURATION: 99 % | DIASTOLIC BLOOD PRESSURE: 74 MMHG

## 2018-04-28 VITALS
OXYGEN SATURATION: 98 % | RESPIRATION RATE: 20 BRPM | SYSTOLIC BLOOD PRESSURE: 155 MMHG | HEART RATE: 73 BPM | TEMPERATURE: 97.1 F | DIASTOLIC BLOOD PRESSURE: 78 MMHG

## 2018-04-28 VITALS — HEART RATE: 60 BPM

## 2018-04-28 RX ADMIN — HYDROCODONE BITARTRATE AND ACETAMINOPHEN PRN TAB: 7.5; 325 TABLET ORAL at 08:44

## 2018-04-28 RX ADMIN — HYDROCODONE BITARTRATE AND ACETAMINOPHEN PRN TAB: 7.5; 325 TABLET ORAL at 03:59

## 2018-04-28 RX ADMIN — Medication SCH ML: at 08:37

## 2018-04-28 NOTE — PD.CARD.PN
Subjective


Subjective Remarks


Doing well over night, no complaints





Objective


Medications





Current Medications








 Medications


  (Trade)  Dose


 Ordered  Sig/Vilma


 Route  Start Time


 Stop Time Status Last Admin


 


  (NS Flush)  2 ml  UNSCH  PRN


 IV FLUSH  4/26/18 20:00


     


 


 


  (NS Flush)  2 ml  BID


 IV FLUSH  4/26/18 21:00


    4/28/18 08:37


 


 


  (Tylenol)  650 mg  Q4H  PRN


 PO  4/26/18 20:00


     


 


 


  (Zofran Inj)  4 mg  Q6H  PRN


 IVP  4/26/18 20:00


     


 


 


  (Narcan Inj)  0.4 mg  UNSCH  PRN


 IV PUSH  4/26/18 20:00


     


 


 


  (Prinivil)  10 mg  DAILY


 PO  4/27/18 09:00


   Future Hold  


 


 


  (Microzide)  12.5 mg  DAILY


 PO  4/27/18 09:00


   Future Hold  


 


 


  (Norco  7.5-325


 Mg)  1 tab  Q4H  PRN


 PO  4/26/18 21:15


    4/28/18 08:44


 








Vital Signs / I&O





Vital Signs








  Date Time  Temp Pulse Resp B/P (MAP) Pulse Ox O2 Delivery O2 Flow Rate FiO2


 


4/28/18 08:00 97.1 73 20 129/78 (95) 98   





    129/82 (98)    





    155/91 (112)    


 


4/28/18 07:31  60      


 


4/28/18 03:36 98.5 89 16 118/74 (89) 99   


 


4/27/18 23:19 98.2 74 16 115/71 (86) 98   


 


4/27/18 20:03 98.2 86 17 124/72 (89) 97   


 


4/27/18 16:10 98.7 79 18 125/75 (92) 99   


 


4/27/18 15:49   18     


 


4/27/18 12:44 97.8 76 18 126/70 (88) 97   





    123/77 (92)    





    135/87 (103)    














I/O      


 


 4/27/18 4/27/18 4/27/18 4/28/18 4/28/18 4/28/18





 07:00 15:00 23:00 07:00 15:00 23:00


 


Intake Total   150 ml   


 


Balance   150 ml   


 


      


 


TPN/PPN   150 ml   








Physical Exam


GENERAL: NAD, AAOx3


SKIN: Warm and dry.


HEAD: Atraumatic. Normocephalic. 


EYES: Pupils equal and round. No scleral icterus. No injection or drainage. 


ENT: No nasal bleeding or discharge.  Mucous membranes pink and moist.


NECK: Trachea midline. No JVD. 


CARDIOVASCULAR: Regular rate and rhythm.  


RESPIRATORY: No accessory muscle use. Clear to auscultation. Breath sounds 

equal bilaterally. 


GASTROINTESTINAL: Abdomen soft, non-tender, nondistended. Hepatic and splenic 

margins not palpable. 


MUSCULOSKELETAL: Extremities without clubbing, cyanosis, or edema. No obvious 

deformities. 


NEUROLOGICAL: Awake and alert. No obvious cranial nerve deficits.  Motor 

grossly within normal limits. Five out of 5 muscle strength in the arms and 

legs.  Normal speech.


PSYCHIATRIC: Appropriate mood and affect; insight and judgment normal.





Assessment and Plan


Problem List:  


(1) Syncope


ICD Codes:  R55 - Syncope and collapse


(2) HTN (hypertension)


ICD Codes:  I10 - Essential (primary) hypertension


(3) Chronic back pain


ICD Codes:  M54.9 - Dorsalgia, unspecified; G89.29 - Other chronic pain


Assessment and Plan


1) Syncope


   Seen by Dr. Gonzalez, unable to do EP study yesterday


   Plan for discharge home today, and Dr. Gonzalez will have patient called on 

Monday to most likely schedule for Wed


   Discussed with patient that he is to not drive until further evaluation by 

Dr. Gonzalez, and await his further recommendations based on what is found during 

the EP study


2) Cardiovascularly stable for discharge











Demarco Hale DO Apr 28, 2018 11:27

## 2018-04-28 NOTE — HHI.PR
Subjective


Remarks


Follow-up on patient with syncopal episode.  Patient seen and examined.  

Patient has had no further episodes of dizziness, lightheadedness or syncope 

since admission.  He denies any chest pain or dyspnea.  He denies any nausea, 

vomiting or abdominal pain.  He denies any bladder or bowel difficulties.  His 

echocardiogram shows low normal EF 50-55%.  He is requesting a pain pill for 

his back pain.





Objective


Vitals





Vital Signs








  Date Time  Temp Pulse Resp B/P (MAP) Pulse Ox O2 Delivery O2 Flow Rate FiO2


 


4/28/18 07:31  60      


 


4/28/18 03:36 98.5 89 16 118/74 (89) 99   


 


4/27/18 23:19 98.2 74 16 115/71 (86) 98   


 


4/27/18 20:03 98.2 86 17 124/72 (89) 97   


 


4/27/18 16:10 98.7 79 18 125/75 (92) 99   


 


4/27/18 15:49   18     


 


4/27/18 12:44 97.8 76 18 126/70 (88) 97   





    123/77 (92)    





    135/87 (103)    














I/O      


 


 4/27/18 4/27/18 4/27/18 4/28/18 4/28/18 4/28/18





 07:00 15:00 23:00 07:00 15:00 23:00


 


Intake Total   150 ml   


 


Balance   150 ml   


 


      


 


TPN/PPN   150 ml   








Result Diagram:  


4/27/18 0622                                                                   

             4/27/18 0622





Imaging





Last Impressions








Carotid Artery Ultrasound 4/26/18 0000 Signed





Impressions: 





 Service Date/Time:  Thursday, April 26, 2018 20:47 - CONCLUSION: No 

significant 





 plaque or narrowing.     Eduardo Anderson MD 








Objective Remarks


GENERAL: Well-developed well-nourished  male patient, in no acute 

distress.  Awake and alert.  Lying in hospital bed.  Appears comfortable.


SKIN: Warm and dry.


HEAD: Atraumatic. Normocephalic. No temporal or scalp tenderness.


EYES: Pupils equal round and reactive. Extraocular motions intact. No scleral 

icterus. No injection or drainage. 


ENT: Nose without bleeding or purulent drainage. Airway patent.  MMM.


NECK: Trachea midline. 


CARDIOVASCULAR: Regular rate and rhythm without murmurs, gallops, or rubs. 


RESPIRATORY: Clear to auscultation. Breath sounds equal bilaterally. No wheezes

, rales, or rhonchi.  


GASTROINTESTINAL: Abdomen soft, non-tender, nondistended. No hepato-splenomegaly

, or palpable masses. No guarding.


MUSCULOSKELETAL: Extremities without clubbing, cyanosis, or edema. No calf 

tenderness. 


NEUROLOGICAL: Awake and alert. Cranial nerves II through XII grossly intact.  

Motor and sensory grossly within normal limits. Normal speech.


PSYCHIATRIC:  Appropriate mood and affect.  Normal judgement and insight.


Procedures


None


Medications and IVs





Current Medications








 Medications


  (Trade)  Dose


 Ordered  Sig/Vilma


 Route  Start Time


 Stop Time Status Last Admin


 


  (NS Flush)  2 ml  UNSCH  PRN


 IV FLUSH  4/26/18 20:00


     


 


 


  (NS Flush)  2 ml  BID


 IV FLUSH  4/26/18 21:00


    4/27/18 22:43


 


 


  (Tylenol)  650 mg  Q4H  PRN


 PO  4/26/18 20:00


     


 


 


  (Zofran Inj)  4 mg  Q6H  PRN


 IVP  4/26/18 20:00


     


 


 


  (Narcan Inj)  0.4 mg  UNSCH  PRN


 IV PUSH  4/26/18 20:00


     


 


 


  (Prinivil)  10 mg  DAILY


 PO  4/27/18 09:00


   Future Hold  


 


 


  (Microzide)  12.5 mg  DAILY


 PO  4/27/18 09:00


   Future Hold  


 


 


  (Norco  7.5-325


 Mg)  1 tab  Q4H  PRN


 PO  4/26/18 21:15


    4/28/18 03:59


 











A/P


Assessment and Plan


52-year-old male with a past medical history significant for hypertension and 

degenerative disc disease presents to the emergency department for evaluation 

of a syncopal episode.  





Syncope


Unclear etiology, may be vasovagal or secondary to orthostatic hypotension or 

opiate/THC related


UDS positive for opiates and cannabis


Patient reports episode of diaphoresis, dizziness, tunnel vision, reported 

abnormal rhythm followed by syncopal episode of 2mins duration


Orthostatic vital signs negative


Carotid ultrasound negative for hemodynamically significant stenosis


Echo shows low normal EF 50-55%


Patient evaluated by Dr. Hale and Dr. Gonzalez


   -plan for outpatient EP and possible implantation of loop recorder this 

coming Wednesday.  Patient will need to follow up with Dr. Gonzalez following 

discharge.


   -Holter monitor to be completed at 10am/follow up on results


   -PT eval recommends outpt PT for back pain management and to facilitate 

functional mobility and safety





Tachycardia, resolved


Unclear etiology, possibly situational


Patient in normal sinus rhythm


   -continue to monitor on telemetry





Leukocytosis, resolved


Suspect reactive


Patient is afebrile





Hyponatremic


Resolved status post IV fluids





Hypertension


Patient has remained normotensive off of BP medications


   -recommend discontinuing home BP med at discharge


   -Continue to monitor BP and adjust treatment accordingly





Chronic back pain


   -[place] as needed





FEN


Heart healthy diet


Electrolytes: Monitor and replete as needed








Discharge patient to home


Condition on discharge: Improved


Heart healthy Diet as tolerated


[place]ill[place] activity except NO DRIVING


Rx written:  None


Follow-up with primary care physician and Dr. Gonzalez of interventional cardiology


Discharge Planning


Discharge pending cardiology clearance











Hilary Hassan Apr 28, 2018 09:02

## 2018-04-29 NOTE — HM
Date Performed: 04/27/2018       Time Performed: 09:54:00

 

HOOKUP DATE:      04/27/18 09:54:00 AM Fri 

 

     

ANALYSIS START TIME:      4/27/2018 9:59:00 AM

     

ANALYSIS END TIME:      4/28/2018 9:47:05 AM

     

PATIENT AGE:      52

     

PATIENT HEIGHT:      73

     

PATIENT WEIGHT:      198

     

DRUG LIST:      ROOM [id number]

     

PATIENT DIAGNOSIS:      SYNCOPE/TACHYCARDIA

     

TEST NARRATIVE:          The patient's average heart rate was 82 BPM.  Heart rates greater than 120 B
PM were noted 1% of the time.  No episodes of bradycardia were noted.     No pauses exceeding 2.0 sec
onds were noted.     60 ventricular ectopics, which represented < 1% of the total beat count, were no
teddy.  The highest ventricular ectopic frequency occurred from 05:00 PM to 06:00 PM Fri.  During this 
time 7 VE(s) occurred.  Ventricular ectopics were observed as 60 isolated beat(s) only.  No couplets 
or runs were noted.     9 supraventricular ectopics, which represented < 1% of the total beat count, 
were noted.  The highest supraventricular ectopic frequency occurred from 12:00 AM to 01:00 AM Sat.  
During this time 9 SVE(s) occurred.     No episodes of ST depression (defined as -1.0 mm or more) wer
e noted in channel 1.  No episodes of ST depression (defined as -1.0 mm or more) were noted in channe
l 2.  No episodes of ST depression (defined as -1.0 mm or more) were noted in channel 3. NO DIARY ENT
TAHIR WERE RECORDED BY THE PATIENT

     

TEST INTERPRETATION:      1) Underlying Sinus rhythm 

 

 2) Rare PVC/PAC 3) No pauses noted 4) Tachycardia noted, most likely sinus tachycardia 5) One episod
e of narrow complex tachycardia for around 10 beats, possible PAT 6) No diary returned for review    
                                                                Signed by : Demarco Hale

## 2018-05-02 ENCOUNTER — HOSPITAL ENCOUNTER (OUTPATIENT)
Dept: HOSPITAL 17 - HDOC | Age: 53
Discharge: HOME | End: 2018-05-02
Attending: INTERNAL MEDICINE
Payer: SELF-PAY

## 2018-05-02 VITALS
RESPIRATION RATE: 18 BRPM | SYSTOLIC BLOOD PRESSURE: 150 MMHG | TEMPERATURE: 98.1 F | OXYGEN SATURATION: 99 % | DIASTOLIC BLOOD PRESSURE: 98 MMHG | HEART RATE: 81 BPM

## 2018-05-02 VITALS — BODY MASS INDEX: 23.78 KG/M2 | HEIGHT: 73 IN | WEIGHT: 179.46 LBS

## 2018-05-02 DIAGNOSIS — I48.91: Primary | ICD-10-CM

## 2018-05-02 DIAGNOSIS — I10: ICD-10-CM

## 2018-05-02 LAB
BASOPHILS # BLD AUTO: 0.1 TH/MM3 (ref 0–0.2)
BASOPHILS NFR BLD: 1 % (ref 0–2)
BUN SERPL-MCNC: 11 MG/DL (ref 7–18)
CALCIUM SERPL-MCNC: 9.2 MG/DL (ref 8.5–10.1)
CHLORIDE SERPL-SCNC: 106 MEQ/L (ref 98–107)
CREAT SERPL-MCNC: 0.94 MG/DL (ref 0.6–1.3)
EOSINOPHIL # BLD: 0.1 TH/MM3 (ref 0–0.4)
EOSINOPHIL NFR BLD: 1 % (ref 0–4)
ERYTHROCYTE [DISTWIDTH] IN BLOOD BY AUTOMATED COUNT: 12.5 % (ref 11.6–17.2)
GFR SERPLBLD BASED ON 1.73 SQ M-ARVRAT: 84 ML/MIN (ref 89–?)
GLUCOSE SERPL-MCNC: 90 MG/DL (ref 74–106)
HCO3 BLD-SCNC: 26.7 MEQ/L (ref 21–32)
HCT VFR BLD CALC: 39 % (ref 39–51)
HGB BLD-MCNC: 14.1 GM/DL (ref 13–17)
INR PPP: 1.1 RATIO
LYMPHOCYTES # BLD AUTO: 1.9 TH/MM3 (ref 1–4.8)
LYMPHOCYTES NFR BLD AUTO: 38 % (ref 9–44)
MCH RBC QN AUTO: 32.3 PG (ref 27–34)
MCHC RBC AUTO-ENTMCNC: 36.1 % (ref 32–36)
MCV RBC AUTO: 89.3 FL (ref 80–100)
MONOCYTE #: 0.5 TH/MM3 (ref 0–0.9)
MONOCYTES NFR BLD: 10.6 % (ref 0–8)
NEUTROPHILS # BLD AUTO: 2.5 TH/MM3 (ref 1.8–7.7)
NEUTROPHILS NFR BLD AUTO: 49.4 % (ref 16–70)
PLATELET # BLD: 287 TH/MM3 (ref 150–450)
PMV BLD AUTO: 8.3 FL (ref 7–11)
PROTHROMBIN TIME: 11.4 SEC (ref 9.8–11.6)
RBC # BLD AUTO: 4.36 MIL/MM3 (ref 4.5–5.9)
SODIUM SERPL-SCNC: 141 MEQ/L (ref 136–145)
WBC # BLD AUTO: 5.1 TH/MM3 (ref 4–11)

## 2018-05-02 PROCEDURE — 86850 RBC ANTIBODY SCREEN: CPT

## 2018-05-02 PROCEDURE — 86900 BLOOD TYPING SEROLOGIC ABO: CPT

## 2018-05-02 PROCEDURE — 93005 ELECTROCARDIOGRAM TRACING: CPT

## 2018-05-02 PROCEDURE — 85610 PROTHROMBIN TIME: CPT

## 2018-05-02 PROCEDURE — 93620 COMP EP EVL R AT VEN PAC&REC: CPT

## 2018-05-02 PROCEDURE — C1730 CATH, EP, 19 OR FEW ELECT: HCPCS

## 2018-05-02 PROCEDURE — 01920 ANES CARDIAC CATHETERIZATION: CPT

## 2018-05-02 PROCEDURE — 93623 PRGRMD STIMJ&PACG IV RX NFS: CPT

## 2018-05-02 PROCEDURE — 80048 BASIC METABOLIC PNL TOTAL CA: CPT

## 2018-05-02 PROCEDURE — 86901 BLOOD TYPING SEROLOGIC RH(D): CPT

## 2018-05-02 PROCEDURE — 85025 COMPLETE CBC W/AUTO DIFF WBC: CPT

## 2018-05-02 PROCEDURE — 85730 THROMBOPLASTIN TIME PARTIAL: CPT

## 2018-05-02 NOTE — CATHPROC
Patient Name: VALENTIN DUNN     MRN: [id number]       Study #: [id number]

Initial MD: Rell Gonzalez       YOB: 1965  Study Date: 05/02/2018

 

 

              Cardiac Catheterization Report

 

 

 

 

5/2/2018 12:15:34 PM        Financial #: [id number]     1 of 7

Patient Name: VALENTIN DUNN            MRN: [id number]                  Study #: [id number]

Initial MD: Rell Gonzalez              YOB: 1965             Study Date: 05/02/2018

 

 

                    Entire Case Report

Patient Information

Patient Name    VALENTIN DUNN      Date of Birth   1965        Age     52 years

 

MRN         [id number]           Financial #    [id number]       Gender    M

 

AlternateID                     Lab Number    2

 

Accession #     [id number]        Room Number    DC02

 

Height (in)     73.0              Height (cm)    185.4           BSA     2.05

 

Weight (lbs)    179.1              Weight (kg)    81.4

 

Patient Address/Phone Number

Home Address                City                 State   Zip Code      Home Phone Number

 

[address] [address] [place]    [address] [phone]

 

Study Information

Study Number    Admission            Scheduled Start                Study Start

 

[id number]    May 2 2018 6:00AM        05/02/2018                  May 2 2018 11:13AM

 

[facility]

 

Electrophysiology Study

 

Admit Source              Facility Department

 

Other                  [facility] - Cath Lab

 

Physician and Clinical Staff

Initial Rell Sweet            Circulator        Abiodun TaylorRT(R)

 

                          Other          Erica Mistry,RN

 

                          Recorder         Audrey Sweeney RN

 

                          Scrub          Thania TruongRT(R) TECH2

 

Procedures Performed

Procedure

 

Ablation Procedure

 

 

 

 

5/2/2018 12:15:34 PM               Financial #: [id number]                2 of 7

Patient Name: VALENTIN DUNN             MRN: [id number]              Study #: [id number]

Initial MD: Rell Gonzalez              YOB: 1965         Study Date: 05/02/2018

 

 

Equipment

Time           Description             Size         Mfg Part Number  Used/Scraped

                                                [id number]

11:17    [employer]   PACK, CCL CUSTOM          *                    Used

                                                *[id number]

11:17    [employer]      MUSTAAF, LIMB            *           2530 *[id number]   Used

                                                [id number]

11:17    [employer]      BLANKET,WARM AIR CCL        *                    Used

                                                *[id number]

                                                874054

11:16    [employer]    CATHETER, JSN, QUAD         FR 5                  Used

                                                *[id number]

                                                221385

11:16    [employer]    CATHETER, JSN, QUAD         FR 5                  Used

                                                *[id number]

                                                141700

11:16    [employer]    CATHETER, JSN, QUAD         FR 5                  Used

                                                *[id number]

                                                [id number]

11:16    [employer]    CATHETER, JSN, QUAD         FR 5                  Used

                                                *[id number]

                                                [id number]

11:17    [employer]    SHEATH, EPS, FR5 FAST CATH     FR 5                  Used

                                                *[id number]

                                                [id number]

11:17    [employer]    SHEATH, EPS, FR5 FAST CATH     FR 5                  Used

                                                *[id number]

                                                308392

11:17    [employer]    SHEATH, EPS, FR5 FAST CATH     FR 5                  Used

                                                *[id number]

                                                [id number]

11:17    [employer]    SHEATH, EPS, FR6 FAST CATH     FR 6                  Used

                                                *[id number]

 

Insurance Information

Insurance Payor

 

None

 

Third Party         Third Party Number

 

SELF PAY           SP

 

 

 

 

Labs

Hgb (g/dl)       Hct (%)       RBC (MIL/MM3)     WBC (l/cumm)      Platelets (thousands)

 

11.60-17.00      35.00-51.00     4.00-5.90       4.00-11.00       150..00

 

14.1          39         4.4          5.1          287

 

Glucose (mg/dl)    BUN (mg/dl)     Creatinine (mg/dl)   BUN:Creatinine (1:x)

74..00      7.00-18.00     0.50-1.30       10.00-20.00

 

90           11         0.9          12.2

 

Na (meq/l)       K (meq/l)

 

136..00     3.50-5.10

 

141          3.6

 

INR (PTT:PT)

 

0.90-1.10

 

1.1

 

 

 

 

5/2/2018 12:15:34 PM                Financial #: [id number]             3 of 7

Patient Name: VALENTIN DUNN              MRN: [id number]             Study #: [id number]

Initial MD: Rell Gonzalez               YOB: 1965        Study Date: 05/02/2018

 

 

Medication

Medication Total Dose (Bolus/Oral)

Medication             Total Dosage/Unit

 

1% XYLOCAINE               20 mL

 

Medications (Bolus/Oral)

Medication         Time Given         Dosage/Unit      Administered By  Reason

 

1% XYLOCAINE        5/2/2018 11:53:46 AM     20 mL        Rell Gonzalez

20 mL 1% XYLOCAINE given in lab by Rell Gonzalez in Right Groin via Subcutaneous.

 

 

Initial Case Assessment

Cardiovascular

HR            NIBP

 

83            146/92

 

Edema Present       Skin color            Skin

 

None           Normal              Warm Dry

 

Circulatory - Right Pulses

Dorsalis Pedis

 

1

 

Scale (0,1,2,3,4,d)

 

Circulatory - Left Pulses

Dorsalis Pedis

 

1

 

Scale (0,1,2,3,4,d)

 

Circulatory - Lower Extremities

Color Lower Right     Color Lower Left

 

 

Normal          Normal

 

Neurological State

             Oriented to time-place-

Alert                       Moves all extremities

             person

 

Respiration - General

Respiration Rate

             SpO2 (%)

(B/min)

18            100

 

 

 

 

5/2/2018 12:15:34 PM                Financial #: [id number]            4 of 7

Patient Name: VALENTIN DUNN            MRN: [id number]                Study #: [id number]

Initial MD: Rell Gonzalez              YOB: 1965           Study Date: 05/02/2018

 

 

Final Case Assessment

Cardiovascular

HR           Rhythm          NIBP          Chest Pain

 

76           SR            125/75         0

 

Edema Present      Skin color           Skin

 

None           Normal             Warm Dry

 

Circulatory - Right Pulses

Dorsalis Pedis

 

1

 

Scale (0,1,2,3,4,d)

 

Circulatory - Left Pulses

Dorsalis Pedis

 

1

 

Scale (0,1,2,3,4,d)

 

Neurological State

            Oriented to time-place-

Alert                      Moves all extremities

            person

 

Respiration - General

Respiration Rate

            SpO2 (%)

(B/min)

18           100

 

 

 

 

Chronological Log

Time    Study Chronological Log

 

11:24:25  Patient arrived via Bed.

 

11:24:26  Patient Name, D.O.B, / Armband Verified By R.N.

 

11:24:27  Consent signed by the physician and the patient and verified by the Cath Lab staff.

 

11:24:28  Pre-op and post- op instructions given; patient acknowledges understanding of instructions.


 

11:24:29  Verbal Stimulation=2 Physical Stimulation=2 Airway=2 Respiration=2 TOTAL=8. (0=absent, 1=li
mited, 2=present)

 

11:24:32  Patient has been NPO for More than 6Hrs.

 

11:24:33  Skin Breakdown- None per patient.

 

11:24:33  Patient Warmer Placed on the Table.

 

11:24:34  Disposable Defibrillator Pads Placed On Patient.

 

11:24:35  Rafael Prominences Protected

 

11:24:42  History and physical on the chart.

 

11:25:38  A # 20 IV was noted in the Forearm (right). Grade = 0 0.9% NaCl @ KVO

 

 

 

5/2/2018 12:15:34 PM               Financial #: [id number]              5 of 7

Patient Name: VALENTIN DUNN             MRN: [id number]                Study #: [id number]

Initial MD: Rell Gonzalez              YOB: 1965           Study Date: 05/02/2018

 

11:25:40  A # 20 IV was noted in the Antecubital (left). Grade = 0 .9% NaCl @ KVO

 

11:26:00  Anesthesia at bedside. Assumes care of patient.

 

11:30:37  Anesthesia at bedside. Assumes care of patient.

      Assessment: Initial Case, HR=83 BPM, IBSI=888/92 mmhg, Edema=None, Color=Normal, Skin = Warm, D
ry

      Right Pulses: Mikal Ped=1

      Left Pulses: Mikal Ped=1

11:36:33  Lower Right Extremities: Color=Normal

      Lower Left Extremities: Color=Normal

      Neurological: State=Alert, Ox3, KRAUSE

      Respiration: Resp=18 B/min, HhR9=009 %

11:38:32  Table restraints applied according to hospital policy

 



      Time Out. Correct patient, procedure, procedure equipment, site and side verified with physicia
n present. Time

11:51:45

      concurred by MD, individual staff and CRNA.

      Time Out #2 - Consents verified, patient in correct position, all results are labled and displa
yed, safety precautions

11:51:47

      taken, antibiotics administered. Time out concurred by MD, individual staff and CRNA in procedu
re

11:52:10  Case Start

 

11:53:46  20 mL 1% XYLOCAINE given in lab by Rell Gonzalez in Right Groin via Subcutaneous.

 

11:55:19  Vascular access was obtained in the Fem Vein (right).

 

11:55:23  Vascular access was obtained in the Fem Vein (right).

 

11:55:23  Vascular access was obtained in the Fem Vein (right).

 

11:55:23  Vascular access was obtained in the Fem Vein (right).

 

11:56:23  A SHEATH, EPS, FR5 FAST CATH FR 5 was advanced into the Fem Vein (right) using the Modified
 Seldinger technique.

 

11:56:30  A SHEATH, EPS, FR5 FAST CATH FR 5 was advanced into the Fem Vein (right) using the Modified
 Seldinger technique.

 

11:56:38  A SHEATH, EPS, FR5 FAST CATH FR 5 was advanced into the Fem Vein (right) using the Modified
 Seldinger technique.

 

11:56:40  A SHEATH, EPS, FR6 FAST CATH FR 6 was advanced into the Fem Vein (right) using the Modified
 Seldinger technique.

      A CATHETER, JSN, QUAD FR 5 was advanced vis Fem Vein (right) and placed in the CS. Placement wa
s visually

11:56:50

      confirmed under fluoroscopy.

      A CATHETER, JSN, QUAD FR 5 was advanced vis Fem Vein (right) and placed in the HIS. Placement w
as visually

11:57:01

      confirmed under fluoroscopy.

      A CATHETER, JSN, QUAD FR 5 was advanced vis Fem Vein (right) and placed in the HRA. Placement w
as visually

11:57:06

      confirmed under fluoroscopy.

      A CATHETER, JSN, QUAD FR 5 was advanced vis Fem Vein (right) and placed in the RVA. Placement w
as visually

11:57:11

      confirmed under fluoroscopy.

12:01:04  ep study in progress

 

12:11:43  AFIB INDUCED

 

12:13:38  EP STUDY COMPLETE

 

12:13:56  Ablation procedure performed: AFIB.

 

12:14:03  EP Procedure was performed.

      Assessment: Final Case, HR=76 BPM, Rhythm=SR, RHRF=933/75 mmhg, Chest Pain=0, Edema=None, Color
=Normal,

      Skin = Warm, Dry

      Right Pulses: Mikal Ped=1

12:14:24

      Left Pulses: Mikal Ped=1

      Neurological: State=Alert, Ox3, KRAUSE

      Respiration: Resp=18 B/min, UqP8=103 %

 

 

 

 

5/2/2018 12:15:34 PM               Financial #: [id number]               6 of 7

Patient Name: VALENTIN DUNN          MRN: [id number]             Study #: [id number]

Initial MD: Rell Gonzalez           YOB: 1965        Study Date: 05/02/2018

 

 

 

 

End Study - Contrast Media Used In Study

Contrast       Total Opened (mL)  Total Used (mL)      Total Wasted (mL)

 

Unspecified     0          0             0

 

End Study - Maximum Contrast Load

Max Contrast Load (mL)

 

452.3

 

End Study - Radiation Exposure

Fluoro Time

(minutes)

0.7

 

 

End Study - Patient Disposition

Complications    Transferred To       Interventional Outcome

 

No          Telemetry Bed       successful

 

 

 

 

5/2/2018 12:15:34 PM             Financial #: [id number]           7 of 7

## 2018-05-02 NOTE — EKG
Date Performed: 05/02/2018       Time Performed: 07:08:38

 

PTAGE:      52 years

 

EKG:      Sinus rhythm 

 

 Normal ECG

 

PREVIOUS TRACING       : 04/26/2018 16.33 Since the previous tracing, no significant change noted

 

DOCTOR:   Sandro Corcoran  Interpretating Date/Time  05/02/2018 11:53:42

## 2018-05-22 NOTE — MA
cc:

Rell Gonzalez MD, Jose R MD

****

 

 

DATE:

05/02/2018

 

PROCEDURE PERFORMED:

Electrophysiologic study, CS cannulation, 3-D mapping.

 

INDICATIONS:

Mr. Rebollar is a 51-year-old  gentleman with history of 

tachyarrhythmia, previous hospitalization with an episode of 

near-syncope, referred for electrophysiology study and ablation.  The 

risks, the nature and the benefits of the procedure are clearly stated

to him.  The risks include pneumothorax, cardiac perforation, stroke, 

need for open heart surgery and even death.  He understood and agreed 

to proceed.

 

PROCEDURE:

After written informed consent was obtained, the patient was brought 

to the EP Lab where he was prepped and draped in the usual surgical 

fashion.  Conscious sedation was initiated and maintained throughout 

the procedure by the anesthesiologist.  Once sedation was verified, 

the right inguinal area was anesthetized with 2% Xylocaine.  Using 

modified Seldinger technique, the right femoral vein was cannulated on

four occasions, four guidewires were advanced.  Over the wire, three 5

and a 6-Indonesian Hemaquet were advanced.  Then, under fluoroscopic 

guidance through the 5 and 6-Indonesian hematocrit, four 5-Indonesian 

Rowan curved quadripolar electrophysiology catheters advanced on 

the His, upper right atrium, coronary sinus and the right ventricular 

apex.  Basic intervals were measured.  They were within normal limits.

 

 

At this point, atrial pacing protocol was performed.  Atrial pacing 

protocol consisted of incremental atrial pacing as well as program 

stimulation of 410 cycle length and up to an extrastimuli delivered.  

During the atrial pacing protocol, the patient went into atrial 

fibrillation.  The patient was cardioverted.  Again the patient went 

into atrial fibrillation requiring cardioversion.  Ventricular pacing 

protocol was performed.  No tachyarrhythmia was induced.  At that 

point, the procedure was complete.  All catheters were removed as well

as the hemaquets.

 

The patient's arrhythmia is atrial fibrillation.  The patient is going

to need to be anticoagulated and subsequently readmitted for atrial 

fibrillation ablation.  The case will be discussed with the patient.  

No incident to reported.  The patient tolerated the procedure.  Blood 

loss minimal.

 

 

1.  ELECTROCARDIOGRAM:  At baseline, the patient was in sinus.  

Post-procedure electrocardiogram was unchanged.

2.  BASIC INTERVAL:  Base cycle length was around 764 milliseconds, AH

at 74, HV was around 40 milliseconds.

3.  ATRIAL PACING PROTOCOL:  Atrial fibrillation was induced.

4.  VENTRICULAR PACING PROTOCOL:  No tachyarrhythmia was induced.

 

CONCLUSION:

Positive electrophysiology study for atrial fibrillation.

 

COMMENT AND RECOMMENDATIONS:

The atrial fibrillation matched the patient's arrhythmia.  This is 

most likely the cause of syncope and palpitation.  As mentioned 

before, the gentleman is going to be anticoagulated and we will 

reschedule for atrial fibrillation ablation.  Also, the case needs to 

be discussed with him before ablation attempt.  3-D mapping used 

during the procedure for catheter placement.

 

 

__________________________________

MD DANIEL Frances/MARISABEL

D: 05/22/2018, 03:31 PM

T: 05/22/2018, 04:24 PM

Visit #: [id number]

Job #: [id number]